# Patient Record
Sex: MALE | Race: WHITE
[De-identification: names, ages, dates, MRNs, and addresses within clinical notes are randomized per-mention and may not be internally consistent; named-entity substitution may affect disease eponyms.]

---

## 2021-05-01 ENCOUNTER — HOSPITAL ENCOUNTER (EMERGENCY)
Dept: HOSPITAL 12 - ER | Age: 31
Discharge: HOME | End: 2021-05-01
Payer: MEDICAID

## 2021-05-01 VITALS — SYSTOLIC BLOOD PRESSURE: 124 MMHG | DIASTOLIC BLOOD PRESSURE: 82 MMHG

## 2021-05-01 VITALS — WEIGHT: 185 LBS | HEIGHT: 67 IN | BODY MASS INDEX: 29.03 KG/M2

## 2021-05-01 DIAGNOSIS — M25.561: ICD-10-CM
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
## 2021-05-01 DIAGNOSIS — S39.012A: Primary | ICD-10-CM

## 2021-05-01 DIAGNOSIS — M25.562: ICD-10-CM

## 2021-05-01 DIAGNOSIS — E78.5: ICD-10-CM
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
## 2021-05-01 PROCEDURE — A4663 DIALYSIS BLOOD PRESSURE CUFF: HCPCS
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
## 2021-05-01 NOTE — NUR
Patient discharged to home in stable condition.  Written and verbal after care 
instructions given. 

Patient verbalizes understanding of instructions. Stressed follow up or return 
to ER for worsening s/s.

VSS. Steady gait. All belongings with patient.